# Patient Record
Sex: MALE | Race: WHITE | Employment: OTHER | URBAN - NONMETROPOLITAN AREA
[De-identification: names, ages, dates, MRNs, and addresses within clinical notes are randomized per-mention and may not be internally consistent; named-entity substitution may affect disease eponyms.]

---

## 2019-06-12 ENCOUNTER — OFFICE VISIT (OUTPATIENT)
Dept: FAMILY MEDICINE | Facility: OTHER | Age: 64
End: 2019-06-12
Attending: FAMILY MEDICINE

## 2019-06-12 VITALS
DIASTOLIC BLOOD PRESSURE: 82 MMHG | OXYGEN SATURATION: 97 % | SYSTOLIC BLOOD PRESSURE: 126 MMHG | RESPIRATION RATE: 17 BRPM | BODY MASS INDEX: 29.39 KG/M2 | WEIGHT: 217 LBS | HEIGHT: 72 IN | HEART RATE: 88 BPM

## 2019-06-12 DIAGNOSIS — Z02.89 ENCOUNTER FOR FEDERAL AVIATION ADMINISTRATION (FAA) EXAMINATION: ICD-10-CM

## 2019-06-12 DIAGNOSIS — Z02.89 ENCOUNTER FOR FEDERAL AVIATION ADMINISTRATION (FAA) EXAMINATION: Primary | ICD-10-CM

## 2019-06-12 LAB
ALBUMIN UR-MCNC: NEGATIVE MG/DL
APPEARANCE UR: CLEAR
BILIRUB UR QL STRIP: NEGATIVE
COLOR UR AUTO: YELLOW
GLUCOSE UR STRIP-MCNC: NEGATIVE MG/DL
HGB UR QL STRIP: NEGATIVE
KETONES UR STRIP-MCNC: NEGATIVE MG/DL
LEUKOCYTE ESTERASE UR QL STRIP: NEGATIVE
NITRATE UR QL: NEGATIVE
PH UR STRIP: 6 PH (ref 4.7–8)
SOURCE: NORMAL
SP GR UR STRIP: 1.02 (ref 1–1.03)
UROBILINOGEN UR STRIP-MCNC: NORMAL MG/DL (ref 0–2)

## 2019-06-12 PROCEDURE — 99499 UNLISTED E&M SERVICE: CPT | Performed by: FAMILY MEDICINE

## 2019-06-12 PROCEDURE — 81003 URINALYSIS AUTO W/O SCOPE: CPT | Performed by: FAMILY MEDICINE

## 2019-06-12 ASSESSMENT — PAIN SCALES - GENERAL: PAINLEVEL: NO PAIN (0)

## 2019-06-12 ASSESSMENT — MIFFLIN-ST. JEOR: SCORE: 1802.31

## 2019-06-12 NOTE — PROGRESS NOTES
SUBJECTIVE:  Caden is a 64 year old male (1955) who is seen for [facility] [employer] Class 3 Medical Exam.   He offers no current complaints.  Identification is validated.    No current outpatient medications on file.    No Known Allergies        OBJECTIVE:  There were no vitals taken for this visit.    For details, please see scanned form.        ASSESSMENT/PLAN:    [facility] Class 3 Medical Exam       Class 3 Medical Certificate issued.  Valid for 2 years.       Form [id number] downloaded, reviewed, and scanned.        Mick Kuo MD

## 2019-06-12 NOTE — NURSING NOTE
Chief Complaint   Patient presents with     FAA Physical       Initial /82 (BP Location: Right arm, Patient Position: Sitting, Cuff Size: Adult Regular)   Pulse 88   Resp 17   Ht 1.829 m (6')   Wt 98.4 kg (217 lb)   SpO2 97%   BMI 29.43 kg/m   Estimated body mass index is 29.43 kg/m  as calculated from the following:    Height as of this encounter: 1.829 m (6').    Weight as of this encounter: 98.4 kg (217 lb).  Medication Reconciliation: complete    Eulalia Hernández LPN

## 2021-06-30 ENCOUNTER — TELEPHONE (OUTPATIENT)
Dept: FAMILY MEDICINE | Facility: OTHER | Age: 66
End: 2021-06-30

## 2021-06-30 NOTE — TELEPHONE ENCOUNTER
3:00 PM    Reason for Call: Phone Call    Description: Patient is in need of a FAA physical. Please call patient to schedule appt.     Was an appointment offered for this call? No  If yes : Appointment type              Date    Preferred method for responding to this message: Telephone Call  What is your phone number ? [phone]    If we cannot reach you directly, may we leave a detailed response at the number you provided? Yes    Can this message wait until your PCP/provider returns, if available today? YES, advised that call would be likely 07/01/2021    Yani Kennedy

## 2021-07-14 ENCOUNTER — OFFICE VISIT (OUTPATIENT)
Dept: FAMILY MEDICINE | Facility: OTHER | Age: 66
End: 2021-07-14
Attending: FAMILY MEDICINE

## 2021-07-14 VITALS
OXYGEN SATURATION: 97 % | DIASTOLIC BLOOD PRESSURE: 80 MMHG | HEART RATE: 75 BPM | HEIGHT: 71 IN | BODY MASS INDEX: 30.66 KG/M2 | SYSTOLIC BLOOD PRESSURE: 126 MMHG | RESPIRATION RATE: 18 BRPM | TEMPERATURE: 96.8 F | WEIGHT: 219 LBS

## 2021-07-14 DIAGNOSIS — Z02.89 ENCOUNTER FOR FEDERAL AVIATION ADMINISTRATION (FAA) EXAMINATION: Primary | ICD-10-CM

## 2021-07-14 LAB
ALBUMIN UR-MCNC: NEGATIVE MG/DL
APPEARANCE UR: CLEAR
BILIRUB UR QL STRIP: NEGATIVE
COLOR UR AUTO: NORMAL
GLUCOSE UR STRIP-MCNC: NEGATIVE MG/DL
HGB UR QL STRIP: NEGATIVE
KETONES UR STRIP-MCNC: NEGATIVE MG/DL
LEUKOCYTE ESTERASE UR QL STRIP: NEGATIVE
NITRATE UR QL: NEGATIVE
PH UR STRIP: 6.5 [PH] (ref 4.7–8)
SP GR UR STRIP: 1 (ref 1–1.03)
UROBILINOGEN UR STRIP-MCNC: NORMAL MG/DL

## 2021-07-14 PROCEDURE — 81003 URINALYSIS AUTO W/O SCOPE: CPT | Performed by: FAMILY MEDICINE

## 2021-07-14 PROCEDURE — 99499 UNLISTED E&M SERVICE: CPT | Performed by: FAMILY MEDICINE

## 2021-07-14 ASSESSMENT — MIFFLIN-ST. JEOR: SCORE: 1785.51

## 2021-07-14 ASSESSMENT — PAIN SCALES - GENERAL: PAINLEVEL: NO PAIN (0)

## 2021-07-14 NOTE — PROGRESS NOTES
"SUBJECTIVE:  Caden is a 66 year old male (1955) who is seen for [facility] Class 3 Medical Exam.   He offers no current complaints.  Identification is validated.    No current outpatient medications on file.    No Known Allergies        OBJECTIVE:  /80 (BP Location: Right arm, Patient Position: Sitting, Cuff Size: Adult Regular)   Pulse 75   Temp 96.8  F (36  C) (Tympanic)   Resp 18   Ht 1.803 m (5' 11\")   Wt 99.3 kg (219 lb)   SpO2 97%   BMI 30.54 kg/m      For details, please see scanned form.        ASSESSMENT/PLAN:    [facility] Class 3 Medical Exam       Class 3 Medical Certificate issued.  Valid for 2 years.       Form [id number] downloaded, reviewed, and scanned.        Mick Kuo MD      "

## 2021-07-14 NOTE — NURSING NOTE
"Chief Complaint   Patient presents with     FAA Physical       Initial /80 (BP Location: Right arm, Patient Position: Sitting, Cuff Size: Adult Regular)   Pulse 75   Temp 96.8  F (36  C) (Tympanic)   Resp 18   Ht 1.803 m (5' 11\")   Wt 99.3 kg (219 lb)   SpO2 97%   BMI 30.54 kg/m   Estimated body mass index is 30.54 kg/m  as calculated from the following:    Height as of this encounter: 1.803 m (5' 11\").    Weight as of this encounter: 99.3 kg (219 lb).  Medication Reconciliation: complete  Eulalia Hernández LPN  "

## 2024-03-08 ENCOUNTER — TELEPHONE (OUTPATIENT)
Dept: FAMILY MEDICINE | Facility: OTHER | Age: 69
End: 2024-03-08

## 2024-03-08 NOTE — TELEPHONE ENCOUNTER
8:50 AM    Reason for Call: Phone Call    Description: pt needs a FAA physica    Was an appointment offered for this call? No  If yes : Appointment type              Date    Preferred method for responding to this message: Telephone Call  What is your phone number ? [phone]     If we cannot reach you directly, may we leave a detailed response at the number you provided? Yes    Can this message wait until your PCP/provider returns, if available today? Fina Mayers

## 2024-03-08 NOTE — TELEPHONE ENCOUNTER
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 Goncalves he will think about it and call back.